# Patient Record
Sex: FEMALE | Race: OTHER | HISPANIC OR LATINO | Employment: UNEMPLOYED | URBAN - METROPOLITAN AREA
[De-identification: names, ages, dates, MRNs, and addresses within clinical notes are randomized per-mention and may not be internally consistent; named-entity substitution may affect disease eponyms.]

---

## 2023-02-20 ENCOUNTER — HOSPITAL ENCOUNTER (EMERGENCY)
Facility: OTHER | Age: 38
Discharge: HOME | End: 2023-02-20
Attending: EMERGENCY MEDICINE

## 2023-02-20 VITALS
HEIGHT: 67 IN | RESPIRATION RATE: 16 BRPM | SYSTOLIC BLOOD PRESSURE: 154 MMHG | BODY MASS INDEX: 23.54 KG/M2 | HEART RATE: 85 BPM | WEIGHT: 150 LBS | DIASTOLIC BLOOD PRESSURE: 85 MMHG | TEMPERATURE: 98 F | OXYGEN SATURATION: 99 %

## 2023-02-20 DIAGNOSIS — R00.2 PALPITATIONS: Primary | ICD-10-CM

## 2023-02-20 LAB
ALBUMIN SERPL BCP-MCNC: 4.2 G/DL (ref 3.5–5.2)
ALP SERPL-CCNC: 87 U/L (ref 55–135)
ALT SERPL W/O P-5'-P-CCNC: 15 U/L (ref 10–44)
ANION GAP SERPL CALC-SCNC: 8 MMOL/L (ref 8–16)
AST SERPL-CCNC: 12 U/L (ref 10–40)
B-HCG UR QL: NEGATIVE
BASOPHILS # BLD AUTO: 0.04 K/UL (ref 0–0.2)
BASOPHILS NFR BLD: 0.8 % (ref 0–1.9)
BILIRUB SERPL-MCNC: 0.4 MG/DL (ref 0.1–1)
BUN SERPL-MCNC: 7 MG/DL (ref 6–20)
CALCIUM SERPL-MCNC: 9 MG/DL (ref 8.7–10.5)
CHLORIDE SERPL-SCNC: 107 MMOL/L (ref 95–110)
CO2 SERPL-SCNC: 23 MMOL/L (ref 23–29)
CREAT SERPL-MCNC: 0.6 MG/DL (ref 0.5–1.4)
CTP QC/QA: YES
DIFFERENTIAL METHOD: ABNORMAL
EOSINOPHIL # BLD AUTO: 0.1 K/UL (ref 0–0.5)
EOSINOPHIL NFR BLD: 1.5 % (ref 0–8)
ERYTHROCYTE [DISTWIDTH] IN BLOOD BY AUTOMATED COUNT: 13.1 % (ref 11.5–14.5)
EST. GFR  (NO RACE VARIABLE): >60 ML/MIN/1.73 M^2
GLUCOSE SERPL-MCNC: 103 MG/DL (ref 70–110)
HCT VFR BLD AUTO: 39.7 % (ref 37–48.5)
HCV AB SERPL QL IA: NEGATIVE
HGB BLD-MCNC: 13.7 G/DL (ref 12–16)
HIV 1+2 AB+HIV1 P24 AG SERPL QL IA: NEGATIVE
IMM GRANULOCYTES # BLD AUTO: 0.01 K/UL (ref 0–0.04)
IMM GRANULOCYTES NFR BLD AUTO: 0.2 % (ref 0–0.5)
LIPASE SERPL-CCNC: 28 U/L (ref 4–60)
LYMPHOCYTES # BLD AUTO: 1.7 K/UL (ref 1–4.8)
LYMPHOCYTES NFR BLD: 35.1 % (ref 18–48)
MCH RBC QN AUTO: 30 PG (ref 27–31)
MCHC RBC AUTO-ENTMCNC: 34.5 G/DL (ref 32–36)
MCV RBC AUTO: 87 FL (ref 82–98)
MONOCYTES # BLD AUTO: 0.3 K/UL (ref 0.3–1)
MONOCYTES NFR BLD: 7.1 % (ref 4–15)
NEUTROPHILS # BLD AUTO: 2.6 K/UL (ref 1.8–7.7)
NEUTROPHILS NFR BLD: 55.3 % (ref 38–73)
NRBC BLD-RTO: 0 /100 WBC
PLATELET # BLD AUTO: 286 K/UL (ref 150–450)
PLATELET BLD QL SMEAR: ABNORMAL
PMV BLD AUTO: 8.5 FL (ref 9.2–12.9)
POTASSIUM SERPL-SCNC: 3.9 MMOL/L (ref 3.5–5.1)
PROT SERPL-MCNC: 7.3 G/DL (ref 6–8.4)
RBC # BLD AUTO: 4.57 M/UL (ref 4–5.4)
SODIUM SERPL-SCNC: 138 MMOL/L (ref 136–145)
TROPONIN I SERPL DL<=0.01 NG/ML-MCNC: 0.01 NG/ML (ref 0–0.03)
WBC # BLD AUTO: 4.76 K/UL (ref 3.9–12.7)

## 2023-02-20 PROCEDURE — 25000003 PHARM REV CODE 250: Performed by: EMERGENCY MEDICINE

## 2023-02-20 PROCEDURE — 99284 EMERGENCY DEPT VISIT MOD MDM: CPT | Mod: 25

## 2023-02-20 PROCEDURE — 84484 ASSAY OF TROPONIN QUANT: CPT | Performed by: EMERGENCY MEDICINE

## 2023-02-20 PROCEDURE — 96360 HYDRATION IV INFUSION INIT: CPT

## 2023-02-20 PROCEDURE — 81025 URINE PREGNANCY TEST: CPT | Performed by: EMERGENCY MEDICINE

## 2023-02-20 PROCEDURE — 93010 ELECTROCARDIOGRAM REPORT: CPT | Mod: ,,, | Performed by: INTERNAL MEDICINE

## 2023-02-20 PROCEDURE — 93010 EKG 12-LEAD: ICD-10-PCS | Mod: ,,, | Performed by: INTERNAL MEDICINE

## 2023-02-20 PROCEDURE — 85025 COMPLETE CBC W/AUTO DIFF WBC: CPT | Performed by: EMERGENCY MEDICINE

## 2023-02-20 PROCEDURE — 80053 COMPREHEN METABOLIC PANEL: CPT | Performed by: EMERGENCY MEDICINE

## 2023-02-20 PROCEDURE — 87389 HIV-1 AG W/HIV-1&-2 AB AG IA: CPT | Performed by: EMERGENCY MEDICINE

## 2023-02-20 PROCEDURE — 86803 HEPATITIS C AB TEST: CPT | Performed by: EMERGENCY MEDICINE

## 2023-02-20 PROCEDURE — 83690 ASSAY OF LIPASE: CPT | Performed by: EMERGENCY MEDICINE

## 2023-02-20 PROCEDURE — 93005 ELECTROCARDIOGRAM TRACING: CPT

## 2023-02-20 RX ADMIN — SODIUM CHLORIDE 1000 ML: 0.9 INJECTION, SOLUTION INTRAVENOUS at 07:02

## 2023-02-20 NOTE — ED PROVIDER NOTES
SCRIBE #1 NOTE: I, John Win, am scribing for, and in the presence of,  Stephie Philip MD.       Source of History:  Patient, partner, .     Chief complaint:  Palpitations (Pt to ER with complaint of heart palpitations. Pt reports that these palpitations come on and off from time to time. Pt reports that last night she was eating chinese rice when the symptoms of epigastric pain and palpitations started. Pt in no acute distress at this time with chest noted to equal rise and fall. Pt AAOx4.)      HPI:  Monisha Ramon is a 38 y.o. female presenting with palpitations that occurred last night. Patient reports 3 prior episodes of similar symptoms. She notes that she ate Chinese shrimp and rice last night.  reports eating the same food with no complications.  She states that the onset occurred after walking up the stairs and laying down to go to sleep. Patient notes that the episode lasted approximately 1 hour.  reports that they currently live in a homeless shelter and spend excessive amounts of time walking outside. He states that she has associated dry skin. She denies pain, syncope, or shortness of breath. No PMHx of DM, HTN, or other cardiac issues. She has no known allergies.      This is the extent to the patients complaints today here in the emergency department.    ROS: As per HPI and below:  General: No fever.  No chills.  Eyes: No visual changes.  ENT: No sore throat. No ear pain  Head: No headache.    Respiratory: No shortness of breath.  Cardiovascular: No chest pain. Notes palpitations.  Abdomen: No abdominal pain.  No nausea or vomiting.  Genito-Urinary: No abnormal urination.  Neurologic: No focal weakness.  No numbness. No syncope.  MSK: no back pain. No myalgias.  Integument: No rashes or lesions. Notes dry skin.  Hematologic: No easy bruising.  Endocrine: No excessive thirst or urination.      Review of patient's allergies indicates:  No Known Allergies    PMH:  As  "per HPI and below:  History reviewed. No pertinent past medical history.  Past Surgical History:   Procedure Laterality Date     SECTION         Social History     Tobacco Use    Smoking status: Never    Smokeless tobacco: Never   Substance Use Topics    Alcohol use: Never    Drug use: Yes     Types: Marijuana       Physical Exam:    BP (!) 154/85   Pulse 85   Temp 98 °F (36.7 °C)   Resp 16   Ht 5' 7" (1.702 m)   Wt 68 kg (150 lb)   SpO2 99%   BMI 23.49 kg/m²   Nursing note and vital signs reviewed.    Appearance: No acute distress.  Eyes: No conjunctival injection.  Neck: No deformity.   ENT: Oropharynx clear.  No stridor.   Chest/ Respiratory: Clear to auscultation bilaterally.  Good air movement.  No wheezes.  No rhonchi. No rales. No accessory muscle use.  Cardiovascular: Regular rate and rhythm.  No murmurs. No gallops. No rubs.  Abdomen: Soft.  Not distended.  Nontender.  No guarding.  No rebound. Non-peritoneal.  Musculoskeletal: Good range of motion all joints.  No deformities.  Neck supple.  No meningismus.  Skin: No rashes seen.  Good turgor.  No abrasions.  No ecchymoses.  Neurologic: Motor intact.  Sensation intact.  Cerebellar intact.  Cranial nerves intact.  Mental Status:  Alert and oriented x 3.  Appropriate, conversant.        I decided to obtain the patient's medical records.  Summary of Medical Records:  Patient has been seen in the emergency department several times since December for various complaints including diarrhea, subconjunctival hemorrhage, tachycardia palpitations    EKG:  I independently reviewed and interpreted the EKG and my findings are as follows:   Sinus tachycardia. Rate of 110. ST normal.    Labs:  Results for orders placed or performed during the hospital encounter of 23   HIV 1/2 Ag/Ab (4th Gen)   Result Value Ref Range    HIV 1/2 Ag/Ab Negative Negative   Hepatitis C Antibody   Result Value Ref Range    Hepatitis C Ab Negative Negative   CBC W/ AUTO " DIFFERENTIAL   Result Value Ref Range    WBC 4.76 3.90 - 12.70 K/uL    RBC 4.57 4.00 - 5.40 M/uL    Hemoglobin 13.7 12.0 - 16.0 g/dL    Hematocrit 39.7 37.0 - 48.5 %    MCV 87 82 - 98 fL    MCH 30.0 27.0 - 31.0 pg    MCHC 34.5 32.0 - 36.0 g/dL    RDW 13.1 11.5 - 14.5 %    Platelets 286 150 - 450 K/uL    MPV 8.5 (L) 9.2 - 12.9 fL    Immature Granulocytes 0.2 0.0 - 0.5 %    Gran # (ANC) 2.6 1.8 - 7.7 K/uL    Immature Grans (Abs) 0.01 0.00 - 0.04 K/uL    Lymph # 1.7 1.0 - 4.8 K/uL    Mono # 0.3 0.3 - 1.0 K/uL    Eos # 0.1 0.0 - 0.5 K/uL    Baso # 0.04 0.00 - 0.20 K/uL    nRBC 0 0 /100 WBC    Gran % 55.3 38.0 - 73.0 %    Lymph % 35.1 18.0 - 48.0 %    Mono % 7.1 4.0 - 15.0 %    Eosinophil % 1.5 0.0 - 8.0 %    Basophil % 0.8 0.0 - 1.9 %    Platelet Estimate Appears normal     Differential Method Automated    Comp. Metabolic Panel   Result Value Ref Range    Sodium 138 136 - 145 mmol/L    Potassium 3.9 3.5 - 5.1 mmol/L    Chloride 107 95 - 110 mmol/L    CO2 23 23 - 29 mmol/L    Glucose 103 70 - 110 mg/dL    BUN 7 6 - 20 mg/dL    Creatinine 0.6 0.5 - 1.4 mg/dL    Calcium 9.0 8.7 - 10.5 mg/dL    Total Protein 7.3 6.0 - 8.4 g/dL    Albumin 4.2 3.5 - 5.2 g/dL    Total Bilirubin 0.4 0.1 - 1.0 mg/dL    Alkaline Phosphatase 87 55 - 135 U/L    AST 12 10 - 40 U/L    ALT 15 10 - 44 U/L    Anion Gap 8 8 - 16 mmol/L    eGFR >60 >60 mL/min/1.73 m^2   Lipase   Result Value Ref Range    Lipase 28 4 - 60 U/L   Troponin I   Result Value Ref Range    Troponin I 0.006 0.000 - 0.026 ng/mL   POCT urine pregnancy   Result Value Ref Range    POC Preg Test, Ur Negative Negative     Acceptable Yes          Initial Impression  38 y.o. female with palpitaions while eating periodically. Also currently unhoused in a shelter. Has to spend excessive amounts of time outdoors. Plan basic labs including thyroid studies, IV fluids, and reassess.    Differential Dx:  SVT, atrial fibrillation, atrial flutter, ventricular arrhythmia, heart  block, MI, orthostatic hypotension, sinus tachycardia, sensitive carotid sinus, stimulant abuse or side effect, electrolyte abnormalities, hyperthyroidism, anxiety.    MDM:      Patient improved with treatment in the emergency department and comfortable going home. Discussed reasons to return and importance of followup.  Patient understands that the emergency visit today is primarily to address immediate concerns and to rule out emergent cause of symptoms and that they may require further workup and evaluation as an outpatient. All questions addressed and patient given discharge instructions and followup information.    Patient is currently unhoused.  She will be given resources for outpatient follow-up for primary care.  She does not have any evidence today of acute medical emergency including no evidence of electrolyte abnormality, arrhythmia, or infection.                    Scribe Attestation:   Scribe #1: I performed the above scribed service and the documentation accurately describes the services I performed. I attest to the accuracy of the note.    Physician Attestation for Scribe: I, Stephie Philip MD, reviewed documentation as scribed in my presence, which is both accurate and complete.    Diagnostic Impression:    1. Palpitations         ED Disposition Condition    Discharge Stable            ED Prescriptions    None       Follow-up Information       Follow up With Specialties Details Why Contact Info    [facility]  Schedule an appointment as soon as possible for a visit   [address] [facility] [address]  [phone]      [facility]    [address] [facility] [address]  [phone]                 Stephie Philip MD  03/17/23 2042

## 2023-02-20 NOTE — ED TRIAGE NOTES
Pt presents to ED c/o intermittent palpitations. Pt states last night she was eating when epigastric pain and palpitations began.  reports homelessness and frequently walking outside in the heat for long periods of time. Denies any cardiac or medical history. Denies shortness of breath, chest pain, fever, N/V.

## 2023-12-11 ENCOUNTER — HOSPITAL ENCOUNTER (EMERGENCY)
Facility: OTHER | Age: 38
Discharge: HOME | End: 2023-12-11
Attending: EMERGENCY MEDICINE

## 2023-12-11 VITALS
HEIGHT: 67 IN | OXYGEN SATURATION: 97 % | WEIGHT: 155 LBS | SYSTOLIC BLOOD PRESSURE: 139 MMHG | HEART RATE: 91 BPM | DIASTOLIC BLOOD PRESSURE: 80 MMHG | RESPIRATION RATE: 18 BRPM | TEMPERATURE: 98 F | BODY MASS INDEX: 24.33 KG/M2

## 2023-12-11 DIAGNOSIS — S39.012A BACK STRAIN, INITIAL ENCOUNTER: Primary | ICD-10-CM

## 2023-12-11 LAB
B-HCG UR QL: NEGATIVE
BILIRUB UR QL STRIP: NEGATIVE
CLARITY UR: CLEAR
COLOR UR: YELLOW
CTP QC/QA: YES
GLUCOSE UR QL STRIP: NEGATIVE
HGB UR QL STRIP: ABNORMAL
KETONES UR QL STRIP: NEGATIVE
LEUKOCYTE ESTERASE UR QL STRIP: NEGATIVE
NITRITE UR QL STRIP: NEGATIVE
PH UR STRIP: 6 [PH] (ref 5–8)
PROT UR QL STRIP: NEGATIVE
SP GR UR STRIP: 1.02 (ref 1–1.03)
URN SPEC COLLECT METH UR: ABNORMAL
UROBILINOGEN UR STRIP-ACNC: NEGATIVE EU/DL

## 2023-12-11 PROCEDURE — 25000003 PHARM REV CODE 250: Performed by: PHYSICIAN ASSISTANT

## 2023-12-11 PROCEDURE — 81003 URINALYSIS AUTO W/O SCOPE: CPT | Performed by: PHYSICIAN ASSISTANT

## 2023-12-11 PROCEDURE — 63600175 PHARM REV CODE 636 W HCPCS: Performed by: PHYSICIAN ASSISTANT

## 2023-12-11 PROCEDURE — 99284 EMERGENCY DEPT VISIT MOD MDM: CPT

## 2023-12-11 PROCEDURE — 81025 URINE PREGNANCY TEST: CPT | Performed by: PHYSICIAN ASSISTANT

## 2023-12-11 PROCEDURE — 96372 THER/PROPH/DIAG INJ SC/IM: CPT | Performed by: PHYSICIAN ASSISTANT

## 2023-12-11 RX ORDER — TIZANIDINE 4 MG/1
4 TABLET ORAL EVERY 8 HOURS PRN
Qty: 12 TABLET | Refills: 0 | Status: SHIPPED | OUTPATIENT
Start: 2023-12-11

## 2023-12-11 RX ORDER — ACETAMINOPHEN 500 MG
1000 TABLET ORAL
Status: COMPLETED | OUTPATIENT
Start: 2023-12-11 | End: 2023-12-11

## 2023-12-11 RX ORDER — KETOROLAC TROMETHAMINE 30 MG/ML
30 INJECTION, SOLUTION INTRAMUSCULAR; INTRAVENOUS
Status: COMPLETED | OUTPATIENT
Start: 2023-12-11 | End: 2023-12-11

## 2023-12-11 RX ADMIN — KETOROLAC TROMETHAMINE 30 MG: 30 INJECTION, SOLUTION INTRAMUSCULAR at 11:12

## 2023-12-11 RX ADMIN — ACETAMINOPHEN 1000 MG: 500 TABLET ORAL at 11:12

## 2023-12-11 NOTE — DISCHARGE INSTRUCTIONS
Tu prueba de embarazo es negativa. Tu orina no está infectada. Hay adam pequeña cantidad de bebe en abarca orina; eden un seguimiento con abarca PCP.  Le recetan un relajante muscular. Panora puede causarle sueño. No opere maquinaria pesada mientras odilon esto.

## 2023-12-11 NOTE — ED PROVIDER NOTES
"Source of History:   Patient     Chief complaint:  Leg Pain (Pt reports BL leg pain that doesn't go away radiating from lower back onset 2 weeks ago. Denies trauma/injury. ) and Possible Pregnancy (Pt also states she hasn't had a period in over a month. Denies bleeding/abd pain. States she's unsure if she's pregnant. )      HPI:  Monisha Ramon is a 38 y.o. female who is presenting to the emergency department with low back pain that radiates into both legs, worse on the left side.  Reports atraumatic pain began 2 weeks ago.  She also reports tingling to her left thigh.  No numbness.  She denies dysuria or urinary frequency.  She states she missed her period this month but had a negative home pregnancy test.  She denies fever, chills, vomiting.  She reports taking Motrin for her symptoms with modest improvement.  Venyo was utilized    ROS: As per HPI     Review of patient's allergies indicates:   Allergen Reactions    Pcn [penicillins] Hives       PMH:  As per HPI and below:  No past medical history on file.  Past Surgical History:   Procedure Laterality Date     SECTION         Social History     Tobacco Use    Smoking status: Never    Smokeless tobacco: Never   Substance Use Topics    Alcohol use: Never    Drug use: Yes     Types: Marijuana       Physical Exam:    /80 (BP Location: Left arm, Patient Position: Sitting)   Pulse 91   Temp 98.3 °F (36.8 °C) (Oral)   Resp 18   Ht 5' 7" (1.702 m)   Wt 70.3 kg (155 lb)   SpO2 97%   BMI 24.28 kg/m²     Nursing note and vital signs reviewed.  Appearance: No acute distress.  Eyes: No conjunctival injection.  ENT: Oropharynx clear.    Chest/ Respiratory: Clear to auscultation bilaterally.  Good air movement.  No wheezes.  No rhonchi. No rales. No accessory muscle use.  Cardiovascular: Regular rate and rhythm.  No murmurs. No gallops. No rubs.  Abdomen: Soft.  Not distended.  Nontender.  No guarding.  No rebound. " Non-peritoneal.  Musculoskeletal: Good range of motion all joints.  No deformities.  Neck supple.  No meningismus.  No CTL midline tenderness, crepitus, masses, step-offs or deformities.  Left-sided paraspinal tenderness to palpation in the left thoracic region.  Negative straight leg raise bilaterally.  Skin: No rashes seen.  Good turgor.  No abrasions.  No ecchymoses.  Neurologic: Motor intact.  Sensation intact.    Strength out of 5 to bilateral lower extremities.  Mental Status:  Alert and oriented x 3.  Appropriate, conversant.     Medical Decision Making  38-year-old healthy female presenting to the emergency department with atraumatic low back pain x2 weeks.  Also concerned of amenorrhea x1 month.  Patient is afebrile, nontoxic appearing hemodynamically stable.    Amount and/or Complexity of Data Reviewed  Labs: ordered.     Details: Urinalysis without signs of infection noting trace occult blood.    Risk  OTC drugs.  Prescription drug management.  Risk Details: Suspect MSK strain, educated patient on treatment.  Will send home with muscle relaxer.  UPT negative.  Advised close follow-up with her PCP/gynecologist.                  Diagnostic Impression:    1. Back strain, initial encounter             This note was created using M Modal Fluency Direct. There may be typographical errors secondary to dictation.        Conrado Laura PA-C  12/11/23 1128

## 2023-12-15 ENCOUNTER — HOSPITAL ENCOUNTER (EMERGENCY)
Facility: OTHER | Age: 38
Discharge: HOME | End: 2023-12-15
Attending: EMERGENCY MEDICINE

## 2023-12-15 VITALS
TEMPERATURE: 98 F | BODY MASS INDEX: 28.19 KG/M2 | WEIGHT: 180 LBS | SYSTOLIC BLOOD PRESSURE: 143 MMHG | DIASTOLIC BLOOD PRESSURE: 89 MMHG | HEART RATE: 86 BPM | OXYGEN SATURATION: 97 % | RESPIRATION RATE: 18 BRPM

## 2023-12-15 DIAGNOSIS — S16.1XXA STRAIN OF NECK MUSCLE, INITIAL ENCOUNTER: Primary | ICD-10-CM

## 2023-12-15 DIAGNOSIS — I10 HYPERTENSION, UNSPECIFIED TYPE: ICD-10-CM

## 2023-12-15 LAB
B-HCG UR QL: NEGATIVE
CTP QC/QA: YES

## 2023-12-15 PROCEDURE — 81025 URINE PREGNANCY TEST: CPT

## 2023-12-15 PROCEDURE — 63600175 PHARM REV CODE 636 W HCPCS

## 2023-12-15 PROCEDURE — 99284 EMERGENCY DEPT VISIT MOD MDM: CPT

## 2023-12-15 PROCEDURE — 96372 THER/PROPH/DIAG INJ SC/IM: CPT

## 2023-12-15 PROCEDURE — 25000003 PHARM REV CODE 250

## 2023-12-15 RX ORDER — METHOCARBAMOL 500 MG/1
500 TABLET, FILM COATED ORAL
Status: COMPLETED | OUTPATIENT
Start: 2023-12-15 | End: 2023-12-15

## 2023-12-15 RX ORDER — LIDOCAINE 50 MG/G
1 PATCH TOPICAL DAILY
Qty: 10 PATCH | Refills: 0 | Status: SHIPPED | OUTPATIENT
Start: 2023-12-15

## 2023-12-15 RX ORDER — METHOCARBAMOL 500 MG/1
1000 TABLET, FILM COATED ORAL 3 TIMES DAILY
Qty: 30 TABLET | Refills: 0 | Status: SHIPPED | OUTPATIENT
Start: 2023-12-15 | End: 2023-12-20

## 2023-12-15 RX ORDER — ACETAMINOPHEN 500 MG
1000 TABLET ORAL
Status: COMPLETED | OUTPATIENT
Start: 2023-12-15 | End: 2023-12-15

## 2023-12-15 RX ORDER — IBUPROFEN 600 MG/1
600 TABLET ORAL EVERY 6 HOURS PRN
Qty: 20 TABLET | Refills: 0 | Status: SHIPPED | OUTPATIENT
Start: 2023-12-15

## 2023-12-15 RX ORDER — LIDOCAINE 50 MG/G
1 PATCH TOPICAL
Status: DISCONTINUED | OUTPATIENT
Start: 2023-12-15 | End: 2023-12-15 | Stop reason: HOSPADM

## 2023-12-15 RX ORDER — KETOROLAC TROMETHAMINE 30 MG/ML
15 INJECTION, SOLUTION INTRAMUSCULAR; INTRAVENOUS
Status: COMPLETED | OUTPATIENT
Start: 2023-12-15 | End: 2023-12-15

## 2023-12-15 RX ADMIN — KETOROLAC TROMETHAMINE 15 MG: 30 INJECTION, SOLUTION INTRAMUSCULAR at 11:12

## 2023-12-15 RX ADMIN — METHOCARBAMOL 500 MG: 500 TABLET ORAL at 11:12

## 2023-12-15 RX ADMIN — ACETAMINOPHEN 1000 MG: 500 TABLET ORAL at 10:12

## 2023-12-15 RX ADMIN — LIDOCAINE 1 PATCH: 50 PATCH CUTANEOUS at 11:12

## 2023-12-15 NOTE — PLAN OF CARE
"Patient currently doesn't have any insurance at this time. Patient needs her HBP medication. Miguel Ángel faxed a cost transfer form to the pharmacy for the amount of $48.00.  Miguel Ángel sent referrals for this patient to Pharmacy Patient Assistance and Patient Financial Assistance for any support that they can offer for this patient.        Your fax has been successfully sent to [phone] at [phone].  ------------------------------------------------------------  From: [id number]  ------------------------------------------------------------  12/15/2023 10:57:45 AM Transmission Record          Sent to [phone] with remote ID "[email or web address] "          Result: (0/339;0/0) Success          Page record: 1 - 2          Elapsed time: 00:49 on channel 32   "

## 2023-12-15 NOTE — ED TRIAGE NOTES
Pt presents to ED c/o upper neck pain radiating to upper back and R breast/chest onset yesterday at approx 1 pm after sweeping and cleaning house. Pt denies any known cardiac hx, fevers, SOB. Denies any recent falls/traumas. CMS intact. Pt endorses taking motrin at home with no relief.

## 2023-12-15 NOTE — DISCHARGE INSTRUCTIONS
Bermudian:  Lo atendieron en urgencias para adam evaluación del dolor de cassi. Creo que probablemente tenga adam distensión de un músculo en el cassi que le causa dolor. Le estoy recetando ibuprofeno (un antiinflamatorio), robaxina (un relajante muscular) y parches de lidoderm para tratar abarca dolor. Abarca presión arterial estuvo elevada hoy, debe realizar un seguimiento con abarca cardiólogo o PCP en [place] para adam evaluación adicional. Regrese a la timothy de emergencias si presenta algún síntoma nuevo o que empeore.    English:  You were seen in the ER for evaluation of neck pain. I believe you likely have a strain of a muscle in your neck causing your pain. I am prescribing you ibuprofen (an anti-inflammatory), robaxin (a muscle relaxer), and lidoderm patches to treat your pain. Your blood pressure was elevated today, you need to follow up with your cardiologist or PCP at [place] for further evaluation. Please return to the ER for any new or worsening symptoms.

## 2023-12-16 NOTE — ED PROVIDER NOTES
Source of History:  Patient    Chief complaint:  Neck Pain (Reports neck pain that radiates to the chest, denies any recent falls. Took ibuprofen with no relief.)      HPI:  Monisha Ramon is a 38 y.o. female presenting with  right-sided neck pain that radiates to the right side of the chest x1 day.  Patient reports that pain began yesterday mid day after she cleaned her house.  She states that she was washing dishes when the pain began.  Reports that the pain is worse with movement.  Reports that the pain begins in the right side her neck radiates down her anterior shoulder and upper back.  Denies any radiating pain down her arm.  She reports intermittent numbness to her bilateral hands.  Denies any history of trauma or injury.  Took ibuprofen yesterday with minimal relief.  Denies any shortness of breath, fevers, chills, nausea, vomiting, abdominal pain.    [employer]  used to obtain history #[id number]    This is the extent to the patients complaints today here in the emergency department.    ROS: As per HPI and below:  Constitutional: No fever.  No chills.  Eyes: No visual changes.   ENT: No sore throat. No ear pain.  Urinary: No abnormal urination.  MSK:  Positive for right-sided neck pain.  Integument: No rashes or lesions.    Review of patient's allergies indicates:   Allergen Reactions    Pcn [penicillins] Hives       PMH:  As per HPI and below:  History reviewed. No pertinent past medical history.  Past Surgical History:   Procedure Laterality Date     SECTION         Social History     Tobacco Use    Smoking status: Never    Smokeless tobacco: Never   Substance Use Topics    Alcohol use: Never    Drug use: Yes     Types: Marijuana       Physical Exam:    BP (!) 143/89 (BP Location: Right arm, Patient Position: Sitting)   Pulse 86   Temp 98 °F (36.7 °C) (Oral)   Resp 18   Wt 81.6 kg (180 lb)   SpO2 97%   BMI 28.19 kg/m²   Nursing note and vital signs reviewed.  Constitutional:  No acute distress.  Sitting up in bed, speaking in full sentences.  Eyes: No conjunctival injection.  Extraocular muscles are intact.  ENT: Normal phonation.  Chest:  No respiratory distress  Cardiovascular:  Regular rate.  2+ radial pulse.  Musculoskeletal:  There is tenderness to palpation of the right cervical paraspinal muscle and along the right trapezius muscle.  No midline CTL tenderness.  No tenderness to palpation of the right arm.  Full active range of motion of right shoulder intact.  Active range of motion of neck intact, pain elicited with lateral bending.   strength equal bilaterally.  Distal extremity neurovascularly intact.  Skin: No rashes seen.  Good turgor.  No abrasions.  No ecchymoses.  Psych: Appropriate, conversant.        I decided to obtain the patient's medical records.    Results for orders placed or performed during the hospital encounter of 12/15/23   POCT urine pregnancy   Result Value Ref Range    POC Preg Test, Ur Negative Negative     Acceptable Yes      Imaging Results    None         MDM:    38 y.o. female with past medical history of hypertension presenting for evaluation of right-sided neck pain that began yesterday.  No history of injury or trauma, however pain began after she cleaned the house, which included sweeping and doing dishes.  She is afebrile and hemodynamically stable.  Noted to have elevated blood pressure of 170/96, not currently on blood pressure medication.  Initial differentials include fracture, dislocation, musculoskeletal sprain/strain, radiculopathy. Exam findings as described above.  Consistent with likely musculoskeletal strain.  No midline cervical tenderness, no indication for imaging.  Discussed treatment options for patient, plan to discharge with ibuprofen, Robaxin, Lidoderm patches. She indicated concerned that she would be unable to afford medications given she does not have insurance due to her immigration status.  Reached out to  social work, who was able to aid patient in covering cost for discharge medications.  Given elevated blood pressure today, patient also needs follow up with PCP for re-evaluation.  Appears she was on propranolol in the past for palpitations, but patient has since discontinued this medication and therefore is on no blood pressure medication at this time.  Social work able to schedule patient with [facility], patient was informed of this appointment and was advised to follow up, she verbalized understanding.  Strict ER return precautions given.  Patient verbalized understanding of discharge plan and was in agreement.    ED Course as of 12/15/23 2213   Fri Dec 15, 2023   1037 Preg Test, Ur: Negative [SW]      ED Course User Index  [SW] Omayra Cedillo PA-C               Diagnostic Impression:    1. Strain of neck muscle, initial encounter    2. Hypertension, unspecified type         ED Disposition Condition    Discharge Stable            ED Prescriptions       Medication Sig Dispense Start Date End Date Auth. Provider    ibuprofen (ADVIL,MOTRIN) 600 MG tablet Take 1 tablet (600 mg total) by mouth every 6 (six) hours as needed for Pain. 20 tablet 12/15/2023 -- Omayra Cedillo PA-C    methocarbamoL (ROBAXIN) 500 MG Tab Take 2 tablets (1,000 mg total) by mouth 3 (three) times daily. for 5 days 30 tablet 12/15/2023 12/20/2023 Omayra Cedillo PA-C    LIDOcaine (LIDODERM) 5 % Place 1 patch onto the skin once daily. Remove & Discard patch within 12 hours or as directed by MD 10 patch 12/15/2023 -- Omayra Cedillo PA-C          Follow-up Information       Follow up With Specialties Details Why Contact Info    [facility]  Schedule an appointment as soon as possible for a visit in 1 week  [address] [facility] [address]  [phone]      [facility] - Emergency Dept Emergency Medicine Go to  If symptoms worsen [address]  [facility] [address] [phone]     [facility]  Go to  scheduled appointment December 28 4pm Hospital Follow Up con MD law Max 28 4:00 PM [facility] [place] - [facility] [address] [address] [phone]            Please pardon typos or dictation errors, as this note was transcribed using M*Modal fluency direct dictation software.      Omayra Cedillo PA-C  12/15/23 2222       Omayra Cedillo PA-C  12/15/23 2222       Omayra Cedillo PA-C  12/15/23 2223

## 2024-07-29 ENCOUNTER — HOSPITAL ENCOUNTER (EMERGENCY)
Facility: OTHER | Age: 39
Discharge: HOME | End: 2024-07-30
Attending: EMERGENCY MEDICINE

## 2024-07-29 VITALS
TEMPERATURE: 98 F | SYSTOLIC BLOOD PRESSURE: 163 MMHG | HEART RATE: 96 BPM | RESPIRATION RATE: 18 BRPM | DIASTOLIC BLOOD PRESSURE: 94 MMHG | OXYGEN SATURATION: 96 %

## 2024-07-29 DIAGNOSIS — H10.9 CONJUNCTIVITIS OF RIGHT EYE, UNSPECIFIED CONJUNCTIVITIS TYPE: Primary | ICD-10-CM

## 2024-07-29 PROCEDURE — 99283 EMERGENCY DEPT VISIT LOW MDM: CPT

## 2024-07-29 RX ORDER — ERYTHROMYCIN 5 MG/G
OINTMENT OPHTHALMIC
Qty: 1 G | Refills: 0 | Status: SHIPPED | OUTPATIENT
Start: 2024-07-29

## 2024-11-08 ENCOUNTER — HOSPITAL ENCOUNTER (EMERGENCY)
Facility: OTHER | Age: 39
Discharge: HOME | End: 2024-11-09
Attending: EMERGENCY MEDICINE

## 2024-11-08 DIAGNOSIS — K11.20 SIALOADENITIS: Primary | ICD-10-CM

## 2024-11-08 LAB
B-HCG UR QL: NEGATIVE
CTP QC/QA: YES

## 2024-11-08 PROCEDURE — 63600175 PHARM REV CODE 636 W HCPCS

## 2024-11-08 PROCEDURE — 81025 URINE PREGNANCY TEST: CPT

## 2024-11-08 PROCEDURE — 99284 EMERGENCY DEPT VISIT MOD MDM: CPT | Mod: 25

## 2024-11-08 PROCEDURE — 25000003 PHARM REV CODE 250

## 2024-11-08 PROCEDURE — 96372 THER/PROPH/DIAG INJ SC/IM: CPT

## 2024-11-08 RX ORDER — DIPHENHYDRAMINE HCL 25 MG
50 CAPSULE ORAL
Status: COMPLETED | OUTPATIENT
Start: 2024-11-08 | End: 2024-11-08

## 2024-11-08 RX ORDER — FAMOTIDINE 20 MG/1
20 TABLET, FILM COATED ORAL
Status: COMPLETED | OUTPATIENT
Start: 2024-11-08 | End: 2024-11-08

## 2024-11-08 RX ORDER — KETOROLAC TROMETHAMINE 30 MG/ML
10 INJECTION, SOLUTION INTRAMUSCULAR; INTRAVENOUS
Status: COMPLETED | OUTPATIENT
Start: 2024-11-08 | End: 2024-11-08

## 2024-11-08 RX ORDER — DEXAMETHASONE SODIUM PHOSPHATE 4 MG/ML
4 INJECTION, SOLUTION INTRA-ARTICULAR; INTRALESIONAL; INTRAMUSCULAR; INTRAVENOUS; SOFT TISSUE
Status: COMPLETED | OUTPATIENT
Start: 2024-11-08 | End: 2024-11-08

## 2024-11-08 RX ADMIN — DEXAMETHASONE SODIUM PHOSPHATE 4 MG: 4 INJECTION, SOLUTION INTRA-ARTICULAR; INTRALESIONAL; INTRAMUSCULAR; INTRAVENOUS; SOFT TISSUE at 11:11

## 2024-11-08 RX ADMIN — DIPHENHYDRAMINE HYDROCHLORIDE 50 MG: 25 CAPSULE ORAL at 11:11

## 2024-11-08 RX ADMIN — KETOROLAC TROMETHAMINE 10 MG: 30 INJECTION, SOLUTION INTRAMUSCULAR; INTRAVENOUS at 11:11

## 2024-11-08 RX ADMIN — FAMOTIDINE 20 MG: 20 TABLET ORAL at 11:11

## 2024-11-09 VITALS
HEIGHT: 66 IN | DIASTOLIC BLOOD PRESSURE: 85 MMHG | OXYGEN SATURATION: 97 % | RESPIRATION RATE: 16 BRPM | WEIGHT: 175 LBS | HEART RATE: 78 BPM | SYSTOLIC BLOOD PRESSURE: 126 MMHG | BODY MASS INDEX: 28.12 KG/M2 | TEMPERATURE: 98 F

## 2024-11-09 NOTE — DISCHARGE INSTRUCTIONS
First, try using throat lozenges or lemon juice for your pain. You can also take Tylenol or ibuprofen as needed for pain. No need for antibiotics at this time given no systemic symptoms.

## 2024-11-09 NOTE — ED PROVIDER NOTES
Encounter Date: 2024       History     Chief Complaint   Patient presents with    Dental Pain     Toothache, left jaw, x 1 hour,      Monisha Ramon is a 39 y.o. healthy female presenting to the emergency department for evaluation of left sided jaw pain and swelling that began tonight while she was eating a banana. She reports pain with opening and closing her mouth. She states that she has heard a clicking sound whenever she opens her mouth. She denies fever, chills, drooling, sore throat, trouble swallowing, SOB or change in her voice. She denies use of new medications or eating anything out of the ordinary. She denies being bitten by anything.       The history is provided by the patient (boyfriend at bedside). A  was used.     Review of patient's allergies indicates:   Allergen Reactions    Pcn [penicillins] Hives     No past medical history on file.  Past Surgical History:   Procedure Laterality Date     SECTION       No family history on file.  Social History     Tobacco Use    Smoking status: Never    Smokeless tobacco: Never   Substance Use Topics    Alcohol use: Never    Drug use: Yes     Types: Marijuana     Review of Systems  As per HPI.    Physical Exam     Initial Vitals [24]   BP Pulse Resp Temp SpO2   (!) 140/83 100 (!) 22 98.3 °F (36.8 °C) 98 %      MAP       --         Physical Exam    Nursing note and vitals reviewed.  Constitutional: She appears well-developed and well-nourished. No distress.   HENT:   Head: Normocephalic and atraumatic.   Mild edema and TTP of left TMJ and left inferior jaw. No erythema or obvious signs of cellulitis. Not hot to touch.  No trismus, stridor, or drooling.  No change in voice.  No hot potato voice.  Uvula is midline.  No tonsillar exudates or edema.  No signs of dental abscess.   Eyes: Conjunctivae and EOM are normal.   Neck: Neck supple.   Normal range of motion.  Cardiovascular:  Normal rate, regular rhythm,  normal heart sounds and intact distal pulses.           Pulmonary/Chest: Breath sounds normal. No respiratory distress. She has no wheezes. She has no rhonchi. She has no rales.   Abdominal: Abdomen is soft. Bowel sounds are normal. She exhibits no distension. There is no abdominal tenderness. There is no rebound and no guarding.   Musculoskeletal:         General: Normal range of motion.      Cervical back: Normal range of motion and neck supple.     Neurological: She is alert and oriented to person, place, and time. She has normal strength.   Skin: Skin is warm and dry.   Psychiatric: She has a normal mood and affect. Her behavior is normal. Judgment and thought content normal.         ED Course   Procedures  Labs Reviewed   POCT URINE PREGNANCY       Result Value    POC Preg Test, Ur Negative       Acceptable Yes            Imaging Results              CT Maxillofacial Without Contrast (Final result)  Result time 11/08/24 23:24:51      Final result by Amy Adamson MD (11/08/24 23:24:51)                   Impression:      Findings may suggest left sialoadenitis and or left facial cellulitis.      Electronically signed by: Amy Adamson  Date:    11/08/2024  Time:    23:24               Narrative:    EXAMINATION:  CT MAXILLOFACIAL:    CLINICAL HISTORY:  Maxillofacial pain;TMJ pain or limited movement;    TECHNIQUE:  Contiguous axial 2 mm images followed x 1 mm reconstructions with multiplanar reformations.  Coronal and sagittal reformatted images were provided.    COMPARISON:  None.    FINDINGS:  The left parotid gland appears to be slightly more prominent than that of the right and a. the left submandibular gland appears to be slightly or increased in attenuation than that of the right.  There is infiltration of fat stranding lateral to the left submandibular gland and deep to the left platysmas musculature.  No displaced facial fractures are identified.  The frontal sinuses are clear. The  frontal ethmoidal recesses are patent. The nasal cavity is clear. The turbinates are within normal limits.  Small and tiny mucous retention cysts or polyps are seen in bilateral maxillary and right sphenoid sinuses.  Mild mucoperiosteal thickening is seen in an isolated right ethmoid air cell.  The maxilla and mandible appear intact. There is no evidence of lytic or expansile bone lesion.    Bilaterally, the optic globes and orbital contents are within normal limits. The optic lenses are normally located. Extraocular musculature and retroconal fat are within normal limits. The visualized calvarium is also intact.                                       Medications   ketorolac injection 9.999 mg (9.999 mg Intramuscular Given 11/8/24 2304)   dexAMETHasone injection 4 mg (4 mg Intramuscular Given 11/8/24 2304)   diphenhydrAMINE capsule 50 mg (50 mg Oral Given 11/8/24 2304)   famotidine tablet 20 mg (20 mg Oral Given 11/8/24 2305)     Medical Decision Making                        Medical Decision Making:   Initial Assessment:   Urgent evaluation of healthy 39-year-old female who presents with left-sided jaw pain and swelling that began tonight while she was eating a banana.  No known food allergies.  Denies eating anything out of the ordinary.  She denies fever or chills.  No other complaints at this time.  On exam, she was well-appearing and nontoxic.  Hemodynamically stable.  Afebrile in the ED. Mild edema and TTP of left TMJ and left inferior jaw. No erythema or obvious signs of cellulitis. Not hot to touch.  No trismus, stridor, or drooling.  No change in voice.  No hot potato voice.  Uvula is midline.  No tonsillar exudates or edema.  No signs of dental abscess.  Will give Toradol for pain.  We will also treat as if she is having an allergic reaction.  Will give dexamethasone, Pepcid, and Benadryl. Will obtain CT maxillofacial.     Clinical Tests:   Lab Tests: Ordered and Reviewed  Radiological Study: Ordered and  Reviewed  ED Management:  UPT is negative.  On review of CT scan, radiologist is calling possible cellulitis versus sialoadenitis.  Patient was evaluated by my supervising physician, Dr. Juarez who performed her own physical exam.  Agrees with radiologist finding of sialoadenitis.  Dr. Juarez who is a native [place] speaker updated the patient on results and findings.  She counseled the patient on using throat lozenges and lemon juice.  I also recommended Tylenol and ibuprofen as needed for any discomfort.  Patient verbalized understanding and agreement with this plan of care. She was given specific return precautions. Advised to follow up with PCP as needed. All questions and concerns addressed. She is stable for discharge.     This note was created with MModal Fluency Direct Dictation. Please excuse any spelling or grammatical errors.             Clinical Impression:  Final diagnoses:  [K11.20] Sialoadenitis (Primary)          ED Disposition Condition    Discharge Stable          ED Prescriptions    None       Follow-up Information    None          Geoff Queen PA-C  11/09/24 [id number]